# Patient Record
Sex: FEMALE | Race: WHITE | Employment: OTHER | URBAN - NONMETROPOLITAN AREA
[De-identification: names, ages, dates, MRNs, and addresses within clinical notes are randomized per-mention and may not be internally consistent; named-entity substitution may affect disease eponyms.]

---

## 2020-03-03 ENCOUNTER — OFFICE VISIT (OUTPATIENT)
Dept: FAMILY MEDICINE CLINIC | Age: 70
End: 2020-03-03
Payer: MEDICARE

## 2020-03-03 VITALS
HEIGHT: 62 IN | HEART RATE: 93 BPM | SYSTOLIC BLOOD PRESSURE: 186 MMHG | DIASTOLIC BLOOD PRESSURE: 110 MMHG | RESPIRATION RATE: 16 BRPM | TEMPERATURE: 97.9 F | OXYGEN SATURATION: 98 % | WEIGHT: 166 LBS | BODY MASS INDEX: 30.55 KG/M2

## 2020-03-03 DIAGNOSIS — Z13.220 SCREENING FOR CHOLESTEROL LEVEL: ICD-10-CM

## 2020-03-03 DIAGNOSIS — R63.5 WEIGHT GAIN: ICD-10-CM

## 2020-03-03 DIAGNOSIS — Z13.1 ENCOUNTER FOR SCREENING EXAMINATION FOR IMPAIRED GLUCOSE REGULATION AND DIABETES MELLITUS: ICD-10-CM

## 2020-03-03 LAB
ALBUMIN SERPL-MCNC: 4.8 G/DL (ref 3.5–5.2)
ALP BLD-CCNC: 77 U/L (ref 35–104)
ALT SERPL-CCNC: 16 U/L (ref 5–33)
ANION GAP SERPL CALCULATED.3IONS-SCNC: 17 MMOL/L (ref 7–19)
AST SERPL-CCNC: 19 U/L (ref 5–32)
BASOPHILS ABSOLUTE: 0.1 K/UL (ref 0–0.2)
BASOPHILS RELATIVE PERCENT: 0.6 % (ref 0–1)
BILIRUB SERPL-MCNC: <0.2 MG/DL (ref 0.2–1.2)
BUN BLDV-MCNC: 14 MG/DL (ref 8–23)
CALCIUM SERPL-MCNC: 9.2 MG/DL (ref 8.8–10.2)
CHLORIDE BLD-SCNC: 106 MMOL/L (ref 98–111)
CHOLESTEROL, TOTAL: 260 MG/DL (ref 160–199)
CO2: 23 MMOL/L (ref 22–29)
CREAT SERPL-MCNC: 0.7 MG/DL (ref 0.5–0.9)
EOSINOPHILS ABSOLUTE: 0.3 K/UL (ref 0–0.6)
EOSINOPHILS RELATIVE PERCENT: 3.1 % (ref 0–5)
GFR NON-AFRICAN AMERICAN: >60
GLUCOSE BLD-MCNC: 99 MG/DL (ref 74–109)
HCT VFR BLD CALC: 43.1 % (ref 37–47)
HDLC SERPL-MCNC: 73 MG/DL (ref 65–121)
HEMOGLOBIN: 13.6 G/DL (ref 12–16)
IMMATURE GRANULOCYTES #: 0.1 K/UL
LDL CHOLESTEROL CALCULATED: 160 MG/DL
LYMPHOCYTES ABSOLUTE: 2.6 K/UL (ref 1.1–4.5)
LYMPHOCYTES RELATIVE PERCENT: 30 % (ref 20–40)
MCH RBC QN AUTO: 30.5 PG (ref 27–31)
MCHC RBC AUTO-ENTMCNC: 31.6 G/DL (ref 33–37)
MCV RBC AUTO: 96.6 FL (ref 81–99)
MONOCYTES ABSOLUTE: 0.6 K/UL (ref 0–0.9)
MONOCYTES RELATIVE PERCENT: 6.4 % (ref 0–10)
NEUTROPHILS ABSOLUTE: 5.1 K/UL (ref 1.5–7.5)
NEUTROPHILS RELATIVE PERCENT: 59.3 % (ref 50–65)
PDW BLD-RTO: 13.2 % (ref 11.5–14.5)
PLATELET # BLD: 291 K/UL (ref 130–400)
PMV BLD AUTO: 10.9 FL (ref 9.4–12.3)
POTASSIUM SERPL-SCNC: 4.4 MMOL/L (ref 3.5–5)
RBC # BLD: 4.46 M/UL (ref 4.2–5.4)
SODIUM BLD-SCNC: 146 MMOL/L (ref 136–145)
T4 FREE: 1.05 NG/DL (ref 0.93–1.7)
TOTAL PROTEIN: 7.7 G/DL (ref 6.6–8.7)
TRIGL SERPL-MCNC: 134 MG/DL (ref 0–149)
TSH SERPL DL<=0.05 MIU/L-ACNC: 1.73 UIU/ML (ref 0.27–4.2)
WBC # BLD: 8.6 K/UL (ref 4.8–10.8)

## 2020-03-03 PROCEDURE — G8417 CALC BMI ABV UP PARAM F/U: HCPCS | Performed by: NURSE PRACTITIONER

## 2020-03-03 PROCEDURE — 4040F PNEUMOC VAC/ADMIN/RCVD: CPT | Performed by: NURSE PRACTITIONER

## 2020-03-03 PROCEDURE — 4004F PT TOBACCO SCREEN RCVD TLK: CPT | Performed by: NURSE PRACTITIONER

## 2020-03-03 PROCEDURE — 3017F COLORECTAL CA SCREEN DOC REV: CPT | Performed by: NURSE PRACTITIONER

## 2020-03-03 PROCEDURE — G8400 PT W/DXA NO RESULTS DOC: HCPCS | Performed by: NURSE PRACTITIONER

## 2020-03-03 PROCEDURE — 1090F PRES/ABSN URINE INCON ASSESS: CPT | Performed by: NURSE PRACTITIONER

## 2020-03-03 PROCEDURE — G8484 FLU IMMUNIZE NO ADMIN: HCPCS | Performed by: NURSE PRACTITIONER

## 2020-03-03 PROCEDURE — G8427 DOCREV CUR MEDS BY ELIG CLIN: HCPCS | Performed by: NURSE PRACTITIONER

## 2020-03-03 PROCEDURE — 99203 OFFICE O/P NEW LOW 30 MIN: CPT | Performed by: NURSE PRACTITIONER

## 2020-03-03 PROCEDURE — 1123F ACP DISCUSS/DSCN MKR DOCD: CPT | Performed by: NURSE PRACTITIONER

## 2020-03-03 SDOH — HEALTH STABILITY: MENTAL HEALTH: HOW OFTEN DO YOU HAVE A DRINK CONTAINING ALCOHOL?: MONTHLY OR LESS

## 2020-03-03 ASSESSMENT — ENCOUNTER SYMPTOMS
COUGH: 1
TROUBLE SWALLOWING: 0
WHEEZING: 1
ABDOMINAL PAIN: 0
BLOOD IN STOOL: 0
SHORTNESS OF BREATH: 1
CONSTIPATION: 0

## 2020-03-03 ASSESSMENT — PATIENT HEALTH QUESTIONNAIRE - PHQ9
SUM OF ALL RESPONSES TO PHQ QUESTIONS 1-9: 0
SUM OF ALL RESPONSES TO PHQ QUESTIONS 1-9: 0
SUM OF ALL RESPONSES TO PHQ9 QUESTIONS 1 & 2: 0
2. FEELING DOWN, DEPRESSED OR HOPELESS: 0
1. LITTLE INTEREST OR PLEASURE IN DOING THINGS: 0

## 2020-03-03 NOTE — PROGRESS NOTES
SUBJECTIVE:  Here today to establish. Patient ID: Adriana Chapman is a 78 y. o.female. HPI:   HPI   Blood pressure in office elevated but states she checks her BP at home and it runs. 138/79. Wanting labs. Haven't had labs in a few years. Hasn't had a mammogram and doesn't want one. Over 25 years. Lump in breast removed benign . D&C,     Gastric ulcer after  past. 12 years. Continues to have stomach pains. Takes OTC Omeprazole, eats probiotic. Past Medical History:   Diagnosis Date    Hx of gastric ulcer      Prior to Visit Medications    Not on File     No Known Allergies  Past Surgical History:   Procedure Laterality Date    BREAST CYST EXCISION Right     GUM SURGERY       Family History   Problem Relation Age of Onset    Heart Disease Mother     No Known Problems Father     Tuberculosis Maternal Grandmother     Tuberculosis Maternal Grandfather      Social History     Socioeconomic History    Marital status:       Spouse name: Not on file    Number of children: Not on file    Years of education: Not on file    Highest education level: Not on file   Occupational History    Not on file   Social Needs    Financial resource strain: Not on file    Food insecurity:     Worry: Not on file     Inability: Not on file    Transportation needs:     Medical: Not on file     Non-medical: Not on file   Tobacco Use    Smoking status: Current Every Day Smoker     Packs/day: 1.00     Start date: 3/3/1963    Smokeless tobacco: Never Used   Substance and Sexual Activity    Alcohol use: Yes     Frequency: Monthly or less    Drug use: Not on file    Sexual activity: Not on file   Lifestyle    Physical activity:     Days per week: Not on file     Minutes per session: Not on file    Stress: Not on file   Relationships    Social connections:     Talks on phone: Not on file     Gets together: Not on file     Attends Scientologist service: Not on file     Active member of club or organization: Not on file     Attends meetings of clubs or organizations: Not on file     Relationship status: Not on file    Intimate partner violence:     Fear of current or ex partner: Not on file     Emotionally abused: Not on file     Physically abused: Not on file     Forced sexual activity: Not on file   Other Topics Concern    Not on file   Social History Narrative    Not on file       Review of Systems   Constitutional: Positive for diaphoresis and unexpected weight change (gained). Negative for chills. HENT: Positive for tinnitus. Negative for hearing loss and trouble swallowing. Eyes: Negative for visual disturbance. Respiratory: Positive for cough, shortness of breath and wheezing. Cardiovascular: Negative for chest pain and leg swelling. Gastrointestinal: Negative for abdominal pain, blood in stool and constipation. Genitourinary: Negative for difficulty urinating, dysuria and menstrual problem. Musculoskeletal: Positive for neck pain. Negative for arthralgias. Neurological: Positive for dizziness. Negative for headaches. Psychiatric/Behavioral: Negative for sleep disturbance. The patient is nervous/anxious. OBJECTIVE:    Physical Exam  Constitutional:       Appearance: Normal appearance. She is well-developed and well-groomed. HENT:      Head: Normocephalic and atraumatic. Right Ear: Tympanic membrane, ear canal and external ear normal. There is no impacted cerumen. Left Ear: Tympanic membrane, ear canal and external ear normal. There is no impacted cerumen. Nose: Nose normal.      Mouth/Throat:      Lips: Pink. Mouth: Mucous membranes are moist.      Dentition: Normal dentition. Pharynx: Oropharynx is clear. Uvula midline. Eyes:      General: Lids are normal.         Right eye: No discharge. Left eye: No discharge. Extraocular Movements: Extraocular movements intact.       Conjunctiva/sclera: Conjunctivae normal.      Right eye: Right conjunctiva is not injected. Left eye: Left conjunctiva is not injected. Pupils: Pupils are equal, round, and reactive to light. Neck:      Musculoskeletal: Full passive range of motion without pain, normal range of motion and neck supple. Thyroid: No thyromegaly. Vascular: No carotid bruit or JVD. Cardiovascular:      Rate and Rhythm: Normal rate and regular rhythm. Pulses: Normal pulses. Carotid pulses are 2+ on the right side and 2+ on the left side. Radial pulses are 2+ on the right side and 2+ on the left side. Heart sounds: Normal heart sounds, S1 normal and S2 normal. No murmur. Pulmonary:      Effort: Pulmonary effort is normal.      Breath sounds: Normal breath sounds. Abdominal:      General: Bowel sounds are normal. There is no distension or abdominal bruit. Palpations: Abdomen is soft. There is no mass. Hernia: No hernia is present. Musculoskeletal: Normal range of motion. Right lower leg: No edema. Left lower leg: No edema. Lymphadenopathy:      Cervical: No cervical adenopathy. Right cervical: No superficial cervical adenopathy. Left cervical: No superficial cervical adenopathy. Upper Body:      Right upper body: No supraclavicular adenopathy. Left upper body: No supraclavicular adenopathy. Skin:     General: Skin is warm and dry. Coloration: Skin is not pale. Findings: No lesion or rash. Nails: There is no clubbing. Neurological:      Mental Status: She is alert and oriented to person, place, and time. Motor: No weakness or tremor. Coordination: Coordination normal.      Deep Tendon Reflexes: Reflexes are normal and symmetric. Psychiatric:         Attention and Perception: Attention normal.         Mood and Affect: Mood normal.         Speech: Speech normal.         Behavior: Behavior normal. Behavior is cooperative. Thought Content:  Thought content normal.         Cognition and

## 2020-05-21 ENCOUNTER — VIRTUAL VISIT (OUTPATIENT)
Dept: FAMILY MEDICINE CLINIC | Age: 70
End: 2020-05-21
Payer: MEDICARE

## 2020-05-21 VITALS
SYSTOLIC BLOOD PRESSURE: 142 MMHG | DIASTOLIC BLOOD PRESSURE: 77 MMHG | BODY MASS INDEX: 30.86 KG/M2 | HEART RATE: 93 BPM | WEIGHT: 166 LBS

## 2020-05-21 PROCEDURE — 99406 BEHAV CHNG SMOKING 3-10 MIN: CPT | Performed by: NURSE PRACTITIONER

## 2020-05-21 PROCEDURE — 4040F PNEUMOC VAC/ADMIN/RCVD: CPT | Performed by: NURSE PRACTITIONER

## 2020-05-21 PROCEDURE — G0438 PPPS, INITIAL VISIT: HCPCS | Performed by: NURSE PRACTITIONER

## 2020-05-21 PROCEDURE — 3017F COLORECTAL CA SCREEN DOC REV: CPT | Performed by: NURSE PRACTITIONER

## 2020-05-21 PROCEDURE — 1123F ACP DISCUSS/DSCN MKR DOCD: CPT | Performed by: NURSE PRACTITIONER

## 2020-05-21 ASSESSMENT — PATIENT HEALTH QUESTIONNAIRE - PHQ9
SUM OF ALL RESPONSES TO PHQ QUESTIONS 1-9: 0
SUM OF ALL RESPONSES TO PHQ QUESTIONS 1-9: 0

## 2020-05-21 ASSESSMENT — LIFESTYLE VARIABLES
HOW OFTEN DURING THE LAST YEAR HAVE YOU FOUND THAT YOU WERE NOT ABLE TO STOP DRINKING ONCE YOU HAD STARTED: 0
HOW OFTEN DURING THE LAST YEAR HAVE YOU FAILED TO DO WHAT WAS NORMALLY EXPECTED FROM YOU BECAUSE OF DRINKING: 0
HOW OFTEN DURING THE LAST YEAR HAVE YOU HAD A FEELING OF GUILT OR REMORSE AFTER DRINKING: 0
HOW OFTEN DO YOU HAVE A DRINK CONTAINING ALCOHOL: 1
AUDIT-C TOTAL SCORE: 1
HAVE YOU OR SOMEONE ELSE BEEN INJURED AS A RESULT OF YOUR DRINKING: 0
HAS A RELATIVE, FRIEND, DOCTOR, OR ANOTHER HEALTH PROFESSIONAL EXPRESSED CONCERN ABOUT YOUR DRINKING OR SUGGESTED YOU CUT DOWN: 0
HOW OFTEN DO YOU HAVE SIX OR MORE DRINKS ON ONE OCCASION: 0
HOW OFTEN DURING THE LAST YEAR HAVE YOU NEEDED AN ALCOHOLIC DRINK FIRST THING IN THE MORNING TO GET YOURSELF GOING AFTER A NIGHT OF HEAVY DRINKING: 0
HOW MANY STANDARD DRINKS CONTAINING ALCOHOL DO YOU HAVE ON A TYPICAL DAY: 0
AUDIT TOTAL SCORE: 1
HOW OFTEN DURING THE LAST YEAR HAVE YOU BEEN UNABLE TO REMEMBER WHAT HAPPENED THE NIGHT BEFORE BECAUSE YOU HAD BEEN DRINKING: 0

## 2020-05-21 NOTE — PROGRESS NOTES
Medicare Annual Wellness Visit  Name: Joyce Blunt Date: 2020   MRN: [id number] Sex: Female   Age: 78 y.o. Ethnicity: Non-/Non    : 1950 Race: Mary Prado is here for Medicare AWV    Screenings for behavioral, psychosocial and functional/safety risks, and cognitive dysfunction are all negative except as indicated below. These results, as well as other patient data from the [address] form, are documented in Flowsheets linked to this Encounter. Pt declines Breast cancer. Will do the cologuard  Does smoke a pk a day. Will be willing to try to half pk. No Known Allergies      Prior to Visit Medications    Not on File         Past Medical History:   Diagnosis Date    Hx of gastric ulcer        Past Surgical History:   Procedure Laterality Date    BREAST CYST EXCISION Right     GUM SURGERY           Family History   Problem Relation Age of Onset    Heart Disease Mother     No Known Problems Father     Tuberculosis Maternal Grandmother     Tuberculosis Maternal Grandfather        CareTeam (Including outside providers/suppliers regularly involved in providing care):   Patient Care Team:  NI Travis as PCP - General (Nurse Practitioner Family)  NI Travis as PCP - [facility] Empaneled Provider    Wt Readings from Last 3 Encounters:   20 166 lb (75.3 kg)   20 166 lb (75.3 kg)     Vitals:    20 0952   BP: (!) 142/77   Pulse: 93   Weight: 166 lb (75.3 kg)     Body mass index is 30.86 kg/m². Based upon direct observation of the patient, evaluation of cognition reveals recent and remote memory intact. Patient's complete Health Risk Assessment and screening values have been reviewed and are found in Flowsheets. The following problems were reviewed today and where indicated follow up appointments were made and/or referrals ordered.     Positive Risk Factor Screenings with Interventions:     Substance Abuse:  Social this time  · Dental exam overdue:  patient encouraged to make appointment with his/her dentist    Safety:     Safety Interventions:  · Patient declines any further evaluation/treatment for this issue    Personalized Preventive Plan   Current Health Maintenance Status    There is no immunization history on file for this patient. Health Maintenance   Topic Date Due    Hepatitis C screen  1950    DTaP/Tdap/Td vaccine (1 - Tdap) 08/30/1968    Breast cancer screen  08/30/1999    Shingles Vaccine (1 of 2) 08/30/1999    Colon cancer screen colonoscopy  08/30/1999    DEXA (modify frequency per FRAX score)  08/30/2004    Pneumococcal 65+ years Vaccine (1 of 1 - PPSV23) 08/30/2014    Annual Wellness Visit (AWV)  03/03/2020    Flu vaccine (Season Ended) 09/01/2020    Lipid screen  03/03/2025    Hepatitis A vaccine  Aged Out    Hepatitis B vaccine  Aged Out    Hib vaccine  Aged Out    Meningococcal (ACWY) vaccine  Aged Out     Recommendations for Naytev Due: see orders and patient instructions/AVS.  . Recommended screening schedule for the next 5-10 years is provided to the patient in written form: see Patient Instructions/AVS.    Jose Mahmood was seen today for medicare awv. Diagnoses and all orders for this visit:    Screening for colon cancer  -     Cologuard; Future    Personal history of tobacco use, presenting hazards to health  -     PA TOBACCO USE CESSATION INTERMEDIATE 3-10 MINUTES [96938]    Asymptomatic menopausal state  -     DEXA Bone Density Axial Skeleton; Future    Routine general medical examination at a health care facility        Plans to come in for Pneumoccocal vaccine 23    Ana Luisa Toledo is a 78 y.o. female being evaluated by a Virtual Visit (video and audio) encounter to address concerns as mentioned above. A caregiver was present when appropriate.  Due to this being a TeleHealth encounter (During [facility] public health emergency), evaluation of the following organ systems was limited: Vitals/Constitutional/EENT/Resp/CV/GI//MS/Neuro/Skin/Heme-Lymph-Imm. Pursuant to the emergency declaration under the [address] [place] [address] and the [employer] Act, this Virtual Visit was conducted with patient's (and/or legal guardian's) consent, to reduce the patient's risk of exposure to COVID-19 and provide necessary medical care. The patient (and/or legal guardian) has also been advised to contact this office for worsening conditions or problems, and seek emergency medical treatment and/or call 911 if deemed necessary. Patient identification was verified at the start of the visit: Yes    Services were provided through a video synchronous discussion virtually to substitute for in-person clinic visit. Patient and provider were located at their individual homes. --NI Khanna on 5/21/2020 at 12:58 PM    An electronic signature was used to authenticate this note.

## 2022-09-13 ENCOUNTER — HOSPITAL ENCOUNTER (OUTPATIENT)
Dept: GENERAL RADIOLOGY | Age: 72
Discharge: HOME | End: 2022-09-13
Payer: MEDICARE

## 2022-09-13 ENCOUNTER — OFFICE VISIT (OUTPATIENT)
Dept: FAMILY MEDICINE CLINIC | Age: 72
End: 2022-09-13
Payer: MEDICARE

## 2022-09-13 VITALS
SYSTOLIC BLOOD PRESSURE: 158 MMHG | WEIGHT: 162 LBS | DIASTOLIC BLOOD PRESSURE: 84 MMHG | HEIGHT: 62 IN | BODY MASS INDEX: 29.81 KG/M2 | TEMPERATURE: 98 F | HEART RATE: 90 BPM | OXYGEN SATURATION: 97 % | RESPIRATION RATE: 20 BRPM

## 2022-09-13 DIAGNOSIS — R05.9 COUGH: ICD-10-CM

## 2022-09-13 DIAGNOSIS — Z87.891 PERSONAL HISTORY OF TOBACCO USE: ICD-10-CM

## 2022-09-13 DIAGNOSIS — R53.82 CHRONIC FATIGUE: ICD-10-CM

## 2022-09-13 DIAGNOSIS — Z87.891 PERSONAL HISTORY OF TOBACCO USE: Primary | ICD-10-CM

## 2022-09-13 LAB
ALBUMIN SERPL-MCNC: 4.3 G/DL (ref 3.5–5.2)
ALP BLD-CCNC: 85 U/L (ref 35–104)
ALT SERPL-CCNC: 21 U/L (ref 5–33)
ANION GAP SERPL CALCULATED.3IONS-SCNC: 12 MMOL/L (ref 7–19)
AST SERPL-CCNC: 26 U/L (ref 5–32)
BASOPHILS ABSOLUTE: 0.1 K/UL (ref 0–0.2)
BASOPHILS RELATIVE PERCENT: 0.6 % (ref 0–1)
BILIRUB SERPL-MCNC: 0.3 MG/DL (ref 0.2–1.2)
BUN BLDV-MCNC: 15 MG/DL (ref 8–23)
CALCIUM SERPL-MCNC: 9.1 MG/DL (ref 8.8–10.2)
CHLORIDE BLD-SCNC: 103 MMOL/L (ref 98–111)
CO2: 22 MMOL/L (ref 22–29)
CREAT SERPL-MCNC: 0.9 MG/DL (ref 0.5–0.9)
EOSINOPHILS ABSOLUTE: 0.2 K/UL (ref 0–0.6)
EOSINOPHILS RELATIVE PERCENT: 1.8 % (ref 0–5)
GFR AFRICAN AMERICAN: >59
GFR NON-AFRICAN AMERICAN: >60
GLUCOSE BLD-MCNC: 96 MG/DL (ref 74–109)
HCT VFR BLD CALC: 39.3 % (ref 37–47)
HEMOGLOBIN: 12.4 G/DL (ref 12–16)
IMMATURE GRANULOCYTES #: 0 K/UL
LYMPHOCYTES ABSOLUTE: 3 K/UL (ref 1.1–4.5)
LYMPHOCYTES RELATIVE PERCENT: 31.2 % (ref 20–40)
MCH RBC QN AUTO: 31.1 PG (ref 27–31)
MCHC RBC AUTO-ENTMCNC: 31.6 G/DL (ref 33–37)
MCV RBC AUTO: 98.5 FL (ref 81–99)
MONOCYTES ABSOLUTE: 0.7 K/UL (ref 0–0.9)
MONOCYTES RELATIVE PERCENT: 7.2 % (ref 0–10)
NEUTROPHILS ABSOLUTE: 5.6 K/UL (ref 1.5–7.5)
NEUTROPHILS RELATIVE PERCENT: 58.9 % (ref 50–65)
PDW BLD-RTO: 13.2 % (ref 11.5–14.5)
PLATELET # BLD: 312 K/UL (ref 130–400)
PMV BLD AUTO: 10.9 FL (ref 9.4–12.3)
POTASSIUM SERPL-SCNC: 4.6 MMOL/L (ref 3.5–5)
RBC # BLD: 3.99 M/UL (ref 4.2–5.4)
SODIUM BLD-SCNC: 137 MMOL/L (ref 136–145)
T4 FREE: 1.13 NG/DL (ref 0.93–1.7)
TOTAL PROTEIN: 7.4 G/DL (ref 6.6–8.7)
TSH SERPL DL<=0.05 MIU/L-ACNC: 1.27 UIU/ML (ref 0.27–4.2)
VITAMIN B-12: 473 PG/ML (ref 211–946)
WBC # BLD: 9.5 K/UL (ref 4.8–10.8)

## 2022-09-13 PROCEDURE — 71046 X-RAY EXAM CHEST 2 VIEWS: CPT | Performed by: RADIOLOGY

## 2022-09-13 PROCEDURE — 71046 X-RAY EXAM CHEST 2 VIEWS: CPT

## 2022-09-13 PROCEDURE — 1090F PRES/ABSN URINE INCON ASSESS: CPT | Performed by: NURSE PRACTITIONER

## 2022-09-13 PROCEDURE — G0296 VISIT TO DETERM LDCT ELIG: HCPCS | Performed by: NURSE PRACTITIONER

## 2022-09-13 PROCEDURE — G8427 DOCREV CUR MEDS BY ELIG CLIN: HCPCS | Performed by: NURSE PRACTITIONER

## 2022-09-13 PROCEDURE — 4004F PT TOBACCO SCREEN RCVD TLK: CPT | Performed by: NURSE PRACTITIONER

## 2022-09-13 PROCEDURE — G8417 CALC BMI ABV UP PARAM F/U: HCPCS | Performed by: NURSE PRACTITIONER

## 2022-09-13 PROCEDURE — 1123F ACP DISCUSS/DSCN MKR DOCD: CPT | Performed by: NURSE PRACTITIONER

## 2022-09-13 PROCEDURE — 71271 CT THORAX LUNG CANCER SCR C-: CPT

## 2022-09-13 PROCEDURE — G8400 PT W/DXA NO RESULTS DOC: HCPCS | Performed by: NURSE PRACTITIONER

## 2022-09-13 PROCEDURE — 99214 OFFICE O/P EST MOD 30 MIN: CPT | Performed by: NURSE PRACTITIONER

## 2022-09-13 PROCEDURE — 3017F COLORECTAL CA SCREEN DOC REV: CPT | Performed by: NURSE PRACTITIONER

## 2022-09-13 SDOH — ECONOMIC STABILITY: FOOD INSECURITY: WITHIN THE PAST 12 MONTHS, YOU WORRIED THAT YOUR FOOD WOULD RUN OUT BEFORE YOU GOT MONEY TO BUY MORE.: NEVER TRUE

## 2022-09-13 SDOH — ECONOMIC STABILITY: FOOD INSECURITY: WITHIN THE PAST 12 MONTHS, THE FOOD YOU BOUGHT JUST DIDN'T LAST AND YOU DIDN'T HAVE MONEY TO GET MORE.: NEVER TRUE

## 2022-09-13 ASSESSMENT — ENCOUNTER SYMPTOMS
DIARRHEA: 1
SHORTNESS OF BREATH: 1
CONSTIPATION: 0
COUGH: 1
BLOOD IN STOOL: 0
TROUBLE SWALLOWING: 0
VOICE CHANGE: 0
ABDOMINAL PAIN: 0

## 2022-09-13 ASSESSMENT — PATIENT HEALTH QUESTIONNAIRE - PHQ9
SUM OF ALL RESPONSES TO PHQ QUESTIONS 1-9: 0
SUM OF ALL RESPONSES TO PHQ9 QUESTIONS 1 & 2: 0
SUM OF ALL RESPONSES TO PHQ QUESTIONS 1-9: 0
2. FEELING DOWN, DEPRESSED OR HOPELESS: 0
1. LITTLE INTEREST OR PLEASURE IN DOING THINGS: 0
SUM OF ALL RESPONSES TO PHQ QUESTIONS 1-9: 0
SUM OF ALL RESPONSES TO PHQ QUESTIONS 1-9: 0

## 2022-09-13 ASSESSMENT — SOCIAL DETERMINANTS OF HEALTH (SDOH): HOW HARD IS IT FOR YOU TO PAY FOR THE VERY BASICS LIKE FOOD, HOUSING, MEDICAL CARE, AND HEATING?: NOT HARD AT ALL

## 2022-09-13 NOTE — PROGRESS NOTES
SUBJECTIVE:  Fatigue  Patient ID: Lyn Rios is a 67 y.o. female. HPI:   Fatigue  Associated symptoms include arthralgias, chest pain, coughing, fatigue and myalgias. Pertinent negatives include no abdominal pain, headaches or weakness. This year can't seem to do things that she use to just making it to noon and then needs to nap. Still smoking. Age started 14years of age. 1 pk a day. Blood elevated in the office. States in a medical office it is high. Pings of chest pain. At times Short of breath. Cough not a lot. to clear throat. Past Medical History:   Diagnosis Date    Hx of gastric ulcer       Prior to Visit Medications    Not on File      No Known Allergies    Review of Systems   Constitutional:  Positive for fatigue. HENT:  Negative for trouble swallowing and voice change. Respiratory:  Positive for cough and shortness of breath. Cardiovascular:  Positive for chest pain and leg swelling. Gastrointestinal:  Positive for diarrhea. Negative for abdominal pain, blood in stool and constipation. Genitourinary:  Negative for difficulty urinating and frequency. Musculoskeletal:  Positive for arthralgias and myalgias. Neurological:  Positive for dizziness. Negative for syncope, weakness and headaches. Psychiatric/Behavioral:  Negative for sleep disturbance. OBJECTIVE:    Physical Exam  Constitutional:       Appearance: Normal appearance. She is well-developed and well-groomed. HENT:      Head: Normocephalic and atraumatic. Right Ear: Tympanic membrane, ear canal and external ear normal. There is no impacted cerumen. Left Ear: Tympanic membrane, ear canal and external ear normal. There is no impacted cerumen. Nose: Nose normal.      Mouth/Throat:      Lips: Pink. Mouth: Mucous membranes are moist.      Dentition: Normal dentition. Pharynx: Oropharynx is clear. Uvula midline. Eyes:      General: Lids are normal.         Right eye: No discharge. Left eye: No discharge. Extraocular Movements: Extraocular movements intact. Conjunctiva/sclera: Conjunctivae normal.      Right eye: Right conjunctiva is not injected. Left eye: Left conjunctiva is not injected. Pupils: Pupils are equal, round, and reactive to light. Neck:      Thyroid: No thyromegaly. Vascular: No carotid bruit or JVD. Cardiovascular:      Rate and Rhythm: Normal rate and regular rhythm. Pulses: Normal pulses. Carotid pulses are 2+ on the right side and 2+ on the left side. Radial pulses are 2+ on the right side and 2+ on the left side. Heart sounds: Normal heart sounds, S1 normal and S2 normal. No murmur heard. Pulmonary:      Effort: Pulmonary effort is normal.      Breath sounds: Normal breath sounds. Chest:   Breasts:     Right: No supraclavicular adenopathy. Left: No supraclavicular adenopathy. Abdominal:      General: Bowel sounds are normal. There is no distension or abdominal bruit. Palpations: Abdomen is soft. There is no mass. Hernia: No hernia is present. Musculoskeletal:         General: Normal range of motion. Cervical back: Full passive range of motion without pain, normal range of motion and neck supple. Right lower leg: No edema. Left lower leg: No edema. Lymphadenopathy:      Cervical: No cervical adenopathy. Right cervical: No superficial cervical adenopathy. Left cervical: No superficial cervical adenopathy. Upper Body:      Right upper body: No supraclavicular adenopathy. Left upper body: No supraclavicular adenopathy. Skin:     General: Skin is warm and dry. Coloration: Skin is not pale. Findings: No lesion or rash. Nails: There is no clubbing. Neurological:      Mental Status: She is alert and oriented to person, place, and time. Motor: No weakness or tremor.       Coordination: Coordination normal.      Deep Tendon Reflexes: Reflexes are normal and symmetric. Psychiatric:         Attention and Perception: Attention normal.         Mood and Affect: Mood normal.         Speech: Speech normal.         Behavior: Behavior normal. Behavior is cooperative. Thought Content: Thought content normal.         Cognition and Memory: Cognition and memory normal.         Judgment: Judgment normal.      BP (!) 158/84 (Site: Left Upper Arm, Position: Sitting, Cuff Size: Large Adult)   Pulse 90   Temp 98 °F (36.7 °C) (Temporal)   Resp 20   Ht 5' 1.5\" (1.562 m)   Wt 162 lb (73.5 kg)   SpO2 97%   BMI 30.11 kg/m²      ASSESSMENT:      ICD-10-CM    1. Personal history of tobacco use  Z87.891 CA VISIT TO DISCUSS LUNG CA SCREEN W LDCT     CT Lung Screen (Annual)     NI Miranda, Card[facility], [place]      2. Chronic fatigue  R53.82 NI Morris, [facility], [place]     CBC with Auto Differential     Comprehensive Metabolic Panel     TSH     T4, Free     Vitamin B12      3. Cough  R05.9 XR CHEST STANDARD (2 VW)          PLAN:    Eduarda BRUNNER Nix: Fatigue and Check-Up      Diagnosis and orders for this visit are above. Low Dose CT (LDCT) Lung Screening criteria met:     Age 50-77(Medicare) or 50-80 (USPSTF)   Pack year smoking >20   Still smoking or less than 15 year since quit   No sign or symptoms of lung cancer   > 11 months since last LDCT     Risks and benefits of lung cancer screening with LDCT scans discussed:    Significance of positive screen - False-positive LDCT results often occur. 95% of all positive results do not lead to a diagnosis of cancer. Usually further imaging can resolve most false-positive results; however, some patients may require invasive procedures. Over diagnosis risk - 10% to 12% of screen-detected lung cancer cases are over diagnosed--that is, the cancer would not have been detected in the patient's lifetime without the screening.     Need for follow up screens annually to continue lung cancer

## 2022-10-21 ENCOUNTER — TELEPHONE (OUTPATIENT)
Dept: CARDIOLOGY CLINIC | Age: 72
End: 2022-10-21

## 2022-10-24 ENCOUNTER — OFFICE VISIT (OUTPATIENT)
Dept: CARDIOLOGY CLINIC | Age: 72
End: 2022-10-24
Payer: MEDICARE

## 2022-10-24 VITALS
WEIGHT: 160 LBS | HEART RATE: 68 BPM | DIASTOLIC BLOOD PRESSURE: 76 MMHG | BODY MASS INDEX: 29.44 KG/M2 | SYSTOLIC BLOOD PRESSURE: 134 MMHG | HEIGHT: 62 IN

## 2022-10-24 DIAGNOSIS — R68.89 DECREASED EXERCISE TOLERANCE: Primary | ICD-10-CM

## 2022-10-24 DIAGNOSIS — R07.89 ATYPICAL CHEST PAIN: Primary | ICD-10-CM

## 2022-10-24 DIAGNOSIS — E78.2 MIXED HYPERLIPIDEMIA: ICD-10-CM

## 2022-10-24 DIAGNOSIS — R06.02 SHORTNESS OF BREATH: ICD-10-CM

## 2022-10-24 DIAGNOSIS — R53.82 CHRONIC FATIGUE: ICD-10-CM

## 2022-10-24 DIAGNOSIS — R68.89 DECREASED EXERCISE TOLERANCE: ICD-10-CM

## 2022-10-24 DIAGNOSIS — F17.219 CIGARETTE NICOTINE DEPENDENCE WITH NICOTINE-INDUCED DISORDER: ICD-10-CM

## 2022-10-24 DIAGNOSIS — R07.89 ATYPICAL CHEST PAIN: ICD-10-CM

## 2022-10-24 PROCEDURE — 1123F ACP DISCUSS/DSCN MKR DOCD: CPT | Performed by: CLINICAL NURSE SPECIALIST

## 2022-10-24 PROCEDURE — 1090F PRES/ABSN URINE INCON ASSESS: CPT | Performed by: CLINICAL NURSE SPECIALIST

## 2022-10-24 PROCEDURE — G8417 CALC BMI ABV UP PARAM F/U: HCPCS | Performed by: CLINICAL NURSE SPECIALIST

## 2022-10-24 PROCEDURE — 4004F PT TOBACCO SCREEN RCVD TLK: CPT | Performed by: CLINICAL NURSE SPECIALIST

## 2022-10-24 PROCEDURE — 93000 ELECTROCARDIOGRAM COMPLETE: CPT | Performed by: CLINICAL NURSE SPECIALIST

## 2022-10-24 PROCEDURE — G8484 FLU IMMUNIZE NO ADMIN: HCPCS | Performed by: CLINICAL NURSE SPECIALIST

## 2022-10-24 PROCEDURE — 3017F COLORECTAL CA SCREEN DOC REV: CPT | Performed by: CLINICAL NURSE SPECIALIST

## 2022-10-24 PROCEDURE — 99203 OFFICE O/P NEW LOW 30 MIN: CPT | Performed by: CLINICAL NURSE SPECIALIST

## 2022-10-24 PROCEDURE — G8427 DOCREV CUR MEDS BY ELIG CLIN: HCPCS | Performed by: CLINICAL NURSE SPECIALIST

## 2022-10-24 PROCEDURE — G8400 PT W/DXA NO RESULTS DOC: HCPCS | Performed by: CLINICAL NURSE SPECIALIST

## 2022-10-24 ASSESSMENT — ENCOUNTER SYMPTOMS
WHEEZING: 0
EYE REDNESS: 0
FACIAL SWELLING: 0
COUGH: 0
NAUSEA: 0
SHORTNESS OF BREATH: 1
ABDOMINAL PAIN: 0
CHEST TIGHTNESS: 0
VOMITING: 0

## 2022-10-24 NOTE — PATIENT INSTRUCTIONS
Quit smoking. Call the [address] [facility] [address] [employer] at the [employer] and [address] [facility] located on the first floor of [place] [address] through hospital main entrance and turn immediately to your left. Patient contact number:  [phone] (home)      Date/Arrival Time:      Stress Echocardiogram      This records the heart's activity during a cardiac stress test.  A stress echocardiogram is a very effective, noninvasive test that can help determine whether you have blockages in your coronary arteries. The exam takes approximately thirty minutes. To help ensure accurate results, patients should take the following steps in preparation for a stress echocardiogram:   Refrain from strenuous activity for 12 hours before the test.   Do not eat, drink, or smoke for two hours prior to the test.  Unless instructed otherwise by your physician, you should continue to take prescribed medications. Wear loose, comfortable clothing and walking shoes. If you need to change this appointment, please call [phone] to reschedule.

## 2022-10-24 NOTE — PROGRESS NOTES
[facility], [address], [address]  [address]  Phone: [phone]  Fax: [phone]    OFFICE VISIT:  10/24/2022    Adilene Jara - : 1950    Reason For Visit:  Cash Belcher is a 67 y.o. female who is here for New Patient and Establish Cardiologist       Diagnosis Orders   1. Decreased exercise tolerance        2. Shortness of breath  EKG 12 lead      3. Atypical chest pain        4. Cigarette nicotine dependence with nicotine-induced disorder        5. Mixed hyperlipidemia              HPI  Patient referred by her PCP for complaints of worsening fatigue, twinges of chest pain and shortness of breath. She reports history of long-term smoking and hyperlipidemia which is untreated by patient choice. .  There is questionable family history of heart disease in her mother    Patient states over the last year she is noticing more fatigue. She is to stop and pace her activities when she is doing exertional things. She notices some associated shortness of breath. He complains of \"twinges\" of chest pain, but these are not related to symptoms of fatigue generally. She is wondering if this is just related to older age. She reports she has to take a nap in the middle of the day if she is so tired and this is unusual for her      StarIN Cole is PCP. Adilene Jara has the following history as recorded in [employer]: There are no problems to display for this patient.     Past Medical History:   Diagnosis Date    Hx of gastric ulcer      Past Surgical History:   Procedure Laterality Date    BREAST CYST EXCISION Right     GUM SURGERY       Family History   Problem Relation Age of Onset    Heart Disease Mother     No Known Problems Father     Diabetes Sister     Tuberculosis Maternal Grandmother     Tuberculosis Maternal Grandfather      Social History     Tobacco Use    Smoking status: Every Day     Packs/day: 0.50     Years: 58.00     Pack years: 29.00     Types: Cigarettes     Start date: 3/3/1963     Passive exposure: Current    Smokeless tobacco: Never   Substance Use Topics    Alcohol use: Yes     Comment: rarely      No current outpatient medications on file. No current facility-administered medications for this visit. Allergies: Patient has no known allergies. Review of Systems  Review of Systems   Constitutional:  Positive for fatigue (worsening). Negative for activity change, diaphoresis, fever and unexpected weight change. HENT:  Negative for facial swelling and nosebleeds. Eyes:  Negative for redness and visual disturbance. Respiratory:  Positive for shortness of breath. Negative for cough, chest tightness and wheezing. Cardiovascular:  Positive for chest pain (twinges, non-exertional, atypical). Negative for palpitations and leg swelling. Gastrointestinal:  Negative for abdominal pain, nausea and vomiting. Endocrine: Negative for cold intolerance and heat intolerance. Genitourinary:  Negative for dysuria and hematuria. Musculoskeletal:  Negative for arthralgias and myalgias. Skin:  Negative for pallor and rash. Neurological:  Negative for dizziness, seizures, syncope, weakness and light-headedness. Hematological:  Does not bruise/bleed easily. Psychiatric/Behavioral:  Negative for agitation. The patient is not nervous/anxious. C/o  being easily anxious     Objective  Vital Signs - /76   Pulse 68   Ht 5' 1.5\" (1.562 m)   Wt 160 lb (72.6 kg)   BMI 29.74 kg/m²   Physical Exam  Vitals and nursing note reviewed. Constitutional:       General: She is not in acute distress. Appearance: Normal appearance. She is well-developed. She is not diaphoretic. HENT:      Head: Normocephalic and atraumatic. Right Ear: Hearing and external ear normal.      Left Ear: Hearing and external ear normal.      Nose: Nose normal.   Eyes:      General:         Right eye: No discharge. Left eye: No discharge. Pupils: Pupils are equal, round, and reactive to light. Neck:      Thyroid: No thyromegaly. Vascular: No carotid bruit or JVD. Trachea: No tracheal deviation. Cardiovascular:      Rate and Rhythm: Normal rate and regular rhythm. Heart sounds: Normal heart sounds. No murmur heard. No friction rub. No gallop. Pulmonary:      Effort: Pulmonary effort is normal. No respiratory distress. Breath sounds: Normal breath sounds. No wheezing or rales. Abdominal:      Palpations: Abdomen is soft. Tenderness: There is no abdominal tenderness. Musculoskeletal:         General: No swelling or deformity. Cervical back: Neck supple. No muscular tenderness. Right lower leg: No edema. Left lower leg: No edema. Comments: Normal gait and station   Skin:     General: Skin is warm and dry. Findings: No rash. Neurological:      General: No focal deficit present. Mental Status: She is alert and oriented to person, place, and time. Cranial Nerves: No cranial nerve deficit.    Psychiatric:         Mood and Affect: Mood normal.         Behavior: Behavior normal.         Judgment: Judgment normal.       Data:  Lab Results   Component Value Date/Time    WBC 9.5 09/13/2022 03:05 PM    RBC 3.99 09/13/2022 03:05 PM    HGB 12.4 09/13/2022 03:05 PM    HCT 39.3 09/13/2022 03:05 PM     09/13/2022 03:05 PM      Lab Results   Component Value Date/Time    CHOL 260 03/03/2020 09:46 AM    TRIG 134 03/03/2020 09:46 AM    HDL 73 03/03/2020 09:46 AM    LDLCALC 160 03/03/2020 09:46 AM     Lab Results   Component Value Date/Time     09/13/2022 03:05 PM    K 4.6 09/13/2022 03:05 PM     09/13/2022 03:05 PM    CO2 22 09/13/2022 03:05 PM    GLUCOSE 96 09/13/2022 03:05 PM    BUN 15 09/13/2022 03:05 PM    CREATININE 0.9 09/13/2022 03:05 PM    CALCIUM 9.1 09/13/2022 03:05 PM    ALT 21 09/13/2022 03:05 PM    AST 26 09/13/2022 03:05 PM     Lab Results   Component Value Date/Time TSH 1.270 09/13/2022 03:05 PM     EKG shows normal sinus rhythm rate 68    Assessment:     Diagnosis Orders   1. Decreased exercise tolerance        2. Shortness of breath  EKG 12 lead      3. Atypical chest pain        4. Cigarette nicotine dependence with nicotine-induced disorder        5. Mixed hyperlipidemia          Patient with decreased exercise tolerance, worsening fatigue, shortness of breath, atypical chest pain, smoking, untreated hyperlipidemia. We will be to check a stress echo to rule out myocardial ischemia. Nicotine dependence-Instructed patient in smoking cessation rationales, strategies, and available resources x 3 minutes. She is slowly tapering back    Stable cardiovascular status. No evidence of overt heart failure,angina or dysrhythmia. Plan    Orders Placed This Encounter   Procedures    EKG 12 lead     Order Specific Question:   Reason for Exam?     Answer: Other     Return in about 1 month (around 11/24/2022) for APRN. Quit smoking. Call the [address] [place] [address] 1-618-QUIT-NOW   Stress Echo    Call with any questionsor concerns  Follow up with NI Donohue for non cardiac problems  Report any new problems  Cardiovascular Fitness-Exercise as tolerated. Strive for 15 minutes of exercise most days of the week. Cardiac / HealthyDiet  Continue current medications as directed  Continue plan of treatment  It is always recommended that you bring your medicationsbottles with you to each visit - this is for your safety!        NI Walker

## 2022-11-28 ENCOUNTER — OFFICE VISIT (OUTPATIENT)
Dept: CARDIOLOGY CLINIC | Age: 72
End: 2022-11-28
Payer: MEDICARE

## 2022-11-28 VITALS
SYSTOLIC BLOOD PRESSURE: 132 MMHG | BODY MASS INDEX: 29.81 KG/M2 | OXYGEN SATURATION: 100 % | WEIGHT: 162 LBS | DIASTOLIC BLOOD PRESSURE: 86 MMHG | HEIGHT: 62 IN | HEART RATE: 75 BPM

## 2022-11-28 DIAGNOSIS — R53.82 CHRONIC FATIGUE: ICD-10-CM

## 2022-11-28 DIAGNOSIS — R06.02 SHORTNESS OF BREATH: ICD-10-CM

## 2022-11-28 DIAGNOSIS — R68.89 DECREASED EXERCISE TOLERANCE: ICD-10-CM

## 2022-11-28 DIAGNOSIS — F17.219 CIGARETTE NICOTINE DEPENDENCE WITH NICOTINE-INDUCED DISORDER: ICD-10-CM

## 2022-11-28 DIAGNOSIS — R94.39 ABNORMAL STRESS ECHO: Primary | ICD-10-CM

## 2022-11-28 DIAGNOSIS — E78.2 MIXED HYPERLIPIDEMIA: ICD-10-CM

## 2022-11-28 DIAGNOSIS — R07.89 ATYPICAL CHEST PAIN: ICD-10-CM

## 2022-11-28 PROCEDURE — 1090F PRES/ABSN URINE INCON ASSESS: CPT | Performed by: CLINICAL NURSE SPECIALIST

## 2022-11-28 PROCEDURE — 99213 OFFICE O/P EST LOW 20 MIN: CPT | Performed by: CLINICAL NURSE SPECIALIST

## 2022-11-28 PROCEDURE — G8400 PT W/DXA NO RESULTS DOC: HCPCS | Performed by: CLINICAL NURSE SPECIALIST

## 2022-11-28 PROCEDURE — G8427 DOCREV CUR MEDS BY ELIG CLIN: HCPCS | Performed by: CLINICAL NURSE SPECIALIST

## 2022-11-28 PROCEDURE — G8484 FLU IMMUNIZE NO ADMIN: HCPCS | Performed by: CLINICAL NURSE SPECIALIST

## 2022-11-28 PROCEDURE — G8417 CALC BMI ABV UP PARAM F/U: HCPCS | Performed by: CLINICAL NURSE SPECIALIST

## 2022-11-28 PROCEDURE — 3017F COLORECTAL CA SCREEN DOC REV: CPT | Performed by: CLINICAL NURSE SPECIALIST

## 2022-11-28 PROCEDURE — 4004F PT TOBACCO SCREEN RCVD TLK: CPT | Performed by: CLINICAL NURSE SPECIALIST

## 2022-11-28 PROCEDURE — 1123F ACP DISCUSS/DSCN MKR DOCD: CPT | Performed by: CLINICAL NURSE SPECIALIST

## 2022-11-28 RX ORDER — FERROUS SULFATE 325(65) MG
325 TABLET ORAL PRN
COMMUNITY

## 2022-11-28 RX ORDER — M-VIT,TX,IRON,MINS/CALC/FOLIC 27MG-0.4MG
1 TABLET ORAL DAILY
COMMUNITY

## 2022-11-28 ASSESSMENT — ENCOUNTER SYMPTOMS
WHEEZING: 0
SHORTNESS OF BREATH: 1
EYE REDNESS: 0
NAUSEA: 0
ABDOMINAL PAIN: 0
VOMITING: 0
CHEST TIGHTNESS: 0
FACIAL SWELLING: 0
COUGH: 0

## 2022-11-28 NOTE — PROGRESS NOTES
[facility], [address] [place] [address]  Phone: [phone]  Fax: [phone]    OFFICE VISIT:  2022    Alex Arteaga - : 1950    Reason For Visit:  Felton De La Paz is a 67 y.o. female who is here for Follow-up (Patient is following up for Magali Justin results. ) and Results       Diagnosis Orders   1. Abnormal stress echo        2. Decreased exercise tolerance  NM MYOCARDIAL SPECT REST EXERCISE OR RX      3. Shortness of breath  NM MYOCARDIAL SPECT REST EXERCISE OR RX      4. Mixed hyperlipidemia        5. Cigarette nicotine dependence with nicotine-induced disorder              HPI  Patient referred by her PCP for complaints of worsening fatigue, twinges of chest pain and shortness of breath. She reports history of long-term smoking and hyperlipidemia which is untreated by patient choice. .  There is questionable family history of heart disease in her mother    At recent visit she had a stress echocardiogram and is here today to discuss results. NI Feldman is PCP. Alex Arteaga has the following history as recorded in [employer]: There are no problems to display for this patient.     Past Medical History:   Diagnosis Date    Hx of gastric ulcer      Past Surgical History:   Procedure Laterality Date    BREAST CYST EXCISION Right     GUM SURGERY       Family History   Problem Relation Age of Onset    Heart Disease Mother     No Known Problems Father     Diabetes Sister     Tuberculosis Maternal Grandmother     Tuberculosis Maternal Grandfather      Social History     Tobacco Use    Smoking status: Every Day     Packs/day: 0.50     Years: 58.00     Pack years: 29.00     Types: Cigarettes     Start date: 3/3/1963     Passive exposure: Current    Smokeless tobacco: Never   Substance Use Topics    Alcohol use: Yes     Comment: rarely      Current Outpatient Medications   Medication Sig Dispense Refill    ferrous sulfate (IRON 325) 325 (65 Fe) MG tablet Take 325 mg by mouth as needed      Multiple Vitamins-Minerals (THERAPEUTIC MULTIVITAMIN-MINERALS) tablet Take 1 tablet by mouth daily       No current facility-administered medications for this visit. Allergies: Patient has no known allergies. Review of Systems  Review of Systems   Constitutional:  Positive for fatigue (worsening). Negative for activity change, diaphoresis, fever and unexpected weight change. HENT:  Negative for facial swelling and nosebleeds. Eyes:  Negative for redness and visual disturbance. Respiratory:  Positive for shortness of breath. Negative for cough, chest tightness and wheezing. Cardiovascular:  Positive for chest pain (twinges, non-exertional, atypical). Negative for palpitations and leg swelling. Gastrointestinal:  Negative for abdominal pain, nausea and vomiting. Endocrine: Negative for cold intolerance and heat intolerance. Genitourinary:  Negative for dysuria and hematuria. Musculoskeletal:  Negative for arthralgias and myalgias. Skin:  Negative for pallor and rash. Neurological:  Negative for dizziness, seizures, syncope, weakness and light-headedness. Hematological:  Does not bruise/bleed easily. Psychiatric/Behavioral:  Negative for agitation. The patient is not nervous/anxious. C/o  being easily anxious     Objective  Vital Signs - /86   Pulse 75   Ht 5' 1.5\" (1.562 m)   Wt 162 lb (73.5 kg)   SpO2 100%   BMI 30.11 kg/m²   Physical Exam  Vitals and nursing note reviewed. Constitutional:       General: She is not in acute distress. Appearance: She is well-developed. She is obese. She is not diaphoretic. HENT:      Head: Normocephalic and atraumatic. Right Ear: Hearing and external ear normal.      Left Ear: Hearing and external ear normal.      Nose: Nose normal.   Eyes:      General:         Right eye: No discharge. Left eye: No discharge. Pupils: Pupils are equal, round, and reactive to light.    Neck: Thyroid: No thyromegaly. Vascular: No carotid bruit or JVD. Trachea: No tracheal deviation. Cardiovascular:      Rate and Rhythm: Normal rate and regular rhythm. Heart sounds: Normal heart sounds. No murmur heard. No friction rub. No gallop. Pulmonary:      Effort: Pulmonary effort is normal. No respiratory distress. Breath sounds: Normal breath sounds. No wheezing or rales. Abdominal:      Palpations: Abdomen is soft. Tenderness: There is no abdominal tenderness. Musculoskeletal:         General: No swelling or deformity. Cervical back: Neck supple. No muscular tenderness. Right lower leg: No edema. Left lower leg: No edema. Comments: Normal gait and station   Skin:     General: Skin is warm and dry. Findings: No rash. Neurological:      General: No focal deficit present. Mental Status: She is alert and oriented to person, place, and time. Cranial Nerves: No cranial nerve deficit.    Psychiatric:         Mood and Affect: Mood normal.         Behavior: Behavior normal.         Judgment: Judgment normal.       Data:  Lab Results   Component Value Date/Time    WBC 9.5 09/13/2022 03:05 PM    RBC 3.99 09/13/2022 03:05 PM    HGB 12.4 09/13/2022 03:05 PM    HCT 39.3 09/13/2022 03:05 PM     09/13/2022 03:05 PM      Lab Results   Component Value Date/Time    CHOL 260 03/03/2020 09:46 AM    TRIG 134 03/03/2020 09:46 AM    HDL 73 03/03/2020 09:46 AM    LDLCALC 160 03/03/2020 09:46 AM     Lab Results   Component Value Date/Time     09/13/2022 03:05 PM    K 4.6 09/13/2022 03:05 PM     09/13/2022 03:05 PM    CO2 22 09/13/2022 03:05 PM    GLUCOSE 96 09/13/2022 03:05 PM    BUN 15 09/13/2022 03:05 PM    CREATININE 0.9 09/13/2022 03:05 PM    CALCIUM 9.1 09/13/2022 03:05 PM    ALT 21 09/13/2022 03:05 PM    AST 26 09/13/2022 03:05 PM     Lab Results   Component Value Date/Time    TSH 1.270 09/13/2022 03:05 PM     Stress echo at [facility]    Assessment:     Diagnosis Orders   1. Abnormal stress echo        2. Decreased exercise tolerance  NM MYOCARDIAL SPECT REST EXERCISE OR RX      3. Shortness of breath  NM MYOCARDIAL SPECT REST EXERCISE OR RX      4. Mixed hyperlipidemia        5. Cigarette nicotine dependence with nicotine-induced disorder          Abnormal stress echo-poor exercise tolerance and EKG changes. Test was read as borderline for ischemia. Higher level Lexiscan nuclear stress test recommended. And will be a Lexiscan nuclear stress test.  She continues to have same symptoms of decreased exercise tolerance and shortness of breath    Nicotine dependence-Instructed patient in smoking cessation rationales, strategies, and available resources x 3 minutes. She is slowly tapering back. Discussed nicotine replacement therapy and setting a quit date. Hyperlipidemia-uncontrolled as evidenced by most recent lipid panel with LDL of 160. She has chosen not to take cholesterol medicine previously. Encouraged her to consider this for CAD prevention. She will discuss further with her PCP    Plan    Orders Placed This Encounter   Procedures    NM MYOCARDIAL SPECT REST EXERCISE OR RX     With myocardial perfusion study with sestamibi     Standing Status:   Future     Standing Expiration Date:   11/28/2023     Order Specific Question:   Reason for Exam?     Answer:   Shortness of breath     Order Specific Question:   Procedure Type     Answer:   Rx     Order Specific Question:   Reason for exam:     Answer:   decreased exercise tolerance, shortness of breath     Return in about 2 months (around 1/28/2023) for APRN. Quit smoking. Call the [address] [place] [address] 2-258-QUIT-NOW   Consider treating cholesterol- discuss with PCP  Дмитрий Ortiz Nuclear Stress Test    Call with any questionsor concerns  Follow up with NI Casey for non cardiac problems  Report any new problems  Cardiovascular Fitness-Exercise as tolerated.   Strive for 15 minutes of exercise most days of the week. Cardiac / HealthyDiet  Continue current medications as directed  Continue plan of treatment  It is always recommended that you bring your medicationsbottles with you to each visit - this is for your safety!        NI Dias

## 2022-11-28 NOTE — PATIENT INSTRUCTIONS
Return in about 2 months (around 1/28/2023) for APRN. Quit smoking. Call the [address] 4-838-QUIT-NOW   Consider treating cholesterol- discuss with PCP  Snow Boykin Nuclear Stress Test    [place] at the [address] and [address] [facility] located on the first floor of [place] [address] through hospital main entrance and turn immediately to your left. Patient's contact number:  [phone] (home)      Lexiscan Stress Test      Lexiscan (regadenoson injection) is a prescription drug given through an IV line that increases blood flow through the arteries of the heart during a cardiac nuclear stress test.     There are two parts to a Lexiscan stress test: the rest portion and the exercise portion. For the rest portion, a radioactive tracer is injected into your arm through the IV. After 30 to 60 minutes, the process of imaging will begin. A nuclear camera will be placed on your chest area and images are taken for the next 15 to 20 minutes. For the exercise portion, a nurse will attach EKG electrodes to your chest to monitor your heart rate. The drug Snow Boykin is administered to simulate stress on the heart. Your heart rhythm will then be monitored for the next few minutes. Your blood pressure will also be monitored throughout the exercise portion. [place] through the exercise portion, a second round of radioactive tracer is injected into your body. Your heart rate and EKG will be monitored for another few minutes after administering the drug. Test Preparation:    Bring a list of your current medications. Do not take any of your medications the morning of the test, but bring all morning medications with you as you will take them after the stress portion of the test is completed. Do not eat Bananas 24 hours prior to test.    No caffeine 24 hours prior to the testing. This includes: coffee, pop/soda, chocolate, cold medications, etc.  Any product that might contain caffeine. No nicotine or alcohol 12 hours prior to your test.   Nothing to eat or drink 6-8 hours prior to appointment time. It is okay to drink small amounts of water during the four hours prior to the test.  Nitroglycerin patches must be taken off 1 hour before testing. Wear comfortable clothing. Please refrain from any strenuous exercise or activities the day before your test, or the day of your test.  The Nuclear Lexiscan Stress test takes about 2 ½ to 3 hours to complete. If for any reason you are unable to keep this appointment, please contact Outpatient Scheduling, [phone], as soon as possible to reschedule.

## 2023-03-16 ENCOUNTER — TELEPHONE (OUTPATIENT)
Dept: FAMILY MEDICINE CLINIC | Age: 73
End: 2023-03-16

## 2023-04-17 ENCOUNTER — OFFICE VISIT (OUTPATIENT)
Dept: CARDIOLOGY CLINIC | Age: 73
End: 2023-04-17
Payer: MEDICARE

## 2023-04-17 VITALS
BODY MASS INDEX: 30.91 KG/M2 | HEIGHT: 62 IN | HEART RATE: 78 BPM | WEIGHT: 168 LBS | SYSTOLIC BLOOD PRESSURE: 128 MMHG | DIASTOLIC BLOOD PRESSURE: 70 MMHG | OXYGEN SATURATION: 97 %

## 2023-04-17 DIAGNOSIS — F17.219 CIGARETTE NICOTINE DEPENDENCE WITH NICOTINE-INDUCED DISORDER: ICD-10-CM

## 2023-04-17 DIAGNOSIS — E78.2 MIXED HYPERLIPIDEMIA: Primary | ICD-10-CM

## 2023-04-17 DIAGNOSIS — Z82.49 FAMILY HISTORY OF HEART DISEASE: ICD-10-CM

## 2023-04-17 PROCEDURE — 99213 OFFICE O/P EST LOW 20 MIN: CPT | Performed by: CLINICAL NURSE SPECIALIST

## 2023-04-17 PROCEDURE — 3017F COLORECTAL CA SCREEN DOC REV: CPT | Performed by: CLINICAL NURSE SPECIALIST

## 2023-04-17 PROCEDURE — 1123F ACP DISCUSS/DSCN MKR DOCD: CPT | Performed by: CLINICAL NURSE SPECIALIST

## 2023-04-17 PROCEDURE — 1090F PRES/ABSN URINE INCON ASSESS: CPT | Performed by: CLINICAL NURSE SPECIALIST

## 2023-04-17 PROCEDURE — 99406 BEHAV CHNG SMOKING 3-10 MIN: CPT | Performed by: CLINICAL NURSE SPECIALIST

## 2023-04-17 PROCEDURE — G8417 CALC BMI ABV UP PARAM F/U: HCPCS | Performed by: CLINICAL NURSE SPECIALIST

## 2023-04-17 PROCEDURE — 4004F PT TOBACCO SCREEN RCVD TLK: CPT | Performed by: CLINICAL NURSE SPECIALIST

## 2023-04-17 PROCEDURE — G8400 PT W/DXA NO RESULTS DOC: HCPCS | Performed by: CLINICAL NURSE SPECIALIST

## 2023-04-17 PROCEDURE — G8427 DOCREV CUR MEDS BY ELIG CLIN: HCPCS | Performed by: CLINICAL NURSE SPECIALIST

## 2023-04-17 RX ORDER — PRAVASTATIN SODIUM 40 MG
40 TABLET ORAL NIGHTLY
Qty: 90 TABLET | Refills: 3 | Status: SHIPPED | OUTPATIENT
Start: 2023-04-17

## 2023-04-17 ASSESSMENT — ENCOUNTER SYMPTOMS
SHORTNESS OF BREATH: 1
WHEEZING: 0
VOMITING: 0
FACIAL SWELLING: 0
CHEST TIGHTNESS: 0
EYE REDNESS: 0
NAUSEA: 0
COUGH: 0
ABDOMINAL PAIN: 0

## 2023-04-17 NOTE — PATIENT INSTRUCTIONS
Return in about 1 year (around 4/17/2024) for APRN. Quit smoking.   Call the [address] [place] [address] 2-128-QUIT-NOW   Start Pravastatin 40mg daily at bedtime  Recheck fasting Lipids 6 weeks- early June  Healthy heart diet  Work up to 30 min of exercise 5 times per week

## 2023-04-17 NOTE — PROGRESS NOTES
[facility], [address] [place] [address], [address] [address]  Phone: [phone]  Fax: [phone]    OFFICE VISIT:  2023    Lolis Givens - : 1950    Reason For Visit:  Corrinne Chang is a 67 y.o. female who is here for Follow-up and Shortness of Breath       Diagnosis Orders   1. Mixed hyperlipidemia  ALT    AST    Lipid Panel    ALT    AST    Lipid Panel      2. Cigarette nicotine dependence with nicotine-induced disorder        3. Family history of heart disease                HPI  Patient was referred by her PCP for complaints of worsening fatigue, twinges of chest pain and shortness of breath. She reports history of long-term smoking and hyperlipidemia which is untreated by patient choice. .  There is family history of heart disease in her mother    Patient had a stress echo that showed some borderline abnormality. A higher level Cely Brice was recommended and she is here today to discuss results. She denies any further exertional chest discomfort. She has some mild dyspnea which she feels is unchanged. There is no complaints of orthopnea, PND, palpitations. She does complain of fatigue and paces her activities      NI Fabian is PCP. Lolis Givens has the following history as recorded in [employer]: There are no problems to display for this patient.     Past Medical History:   Diagnosis Date    Hx of gastric ulcer      Past Surgical History:   Procedure Laterality Date    BREAST CYST EXCISION Right     GUM SURGERY       Family History   Problem Relation Age of Onset    Heart Disease Mother     No Known Problems Father     Diabetes Sister     Tuberculosis Maternal Grandmother     Tuberculosis Maternal Grandfather      Social History     Tobacco Use    Smoking status: Every Day     Packs/day: 0.50     Years: 58.00     Pack years: 29.00     Types: Cigarettes     Start date: 3/3/1963     Passive exposure: Current    Smokeless tobacco: Never   Substance Use Topics

## 2024-04-24 DIAGNOSIS — E78.2 MIXED HYPERLIPIDEMIA: Primary | ICD-10-CM

## 2024-05-06 DIAGNOSIS — E78.2 MIXED HYPERLIPIDEMIA: ICD-10-CM

## 2024-05-06 LAB
ALT SERPL-CCNC: 14 U/L (ref 5–33)
AST SERPL-CCNC: 19 U/L (ref 5–32)
CHOLEST SERPL-MCNC: 188 MG/DL (ref 160–199)
HDLC SERPL-MCNC: 62 MG/DL (ref 65–121)
LDLC SERPL CALC-MCNC: 98 MG/DL
TRIGL SERPL-MCNC: 138 MG/DL (ref 0–149)

## 2024-05-13 ENCOUNTER — OFFICE VISIT (OUTPATIENT)
Dept: CARDIOLOGY CLINIC | Age: 74
End: 2024-05-13
Payer: MEDICARE

## 2024-05-13 VITALS
BODY MASS INDEX: 31.47 KG/M2 | DIASTOLIC BLOOD PRESSURE: 92 MMHG | WEIGHT: 171 LBS | HEIGHT: 62 IN | HEART RATE: 71 BPM | SYSTOLIC BLOOD PRESSURE: 180 MMHG

## 2024-05-13 DIAGNOSIS — Z82.49 FAMILY HISTORY OF HEART DISEASE: ICD-10-CM

## 2024-05-13 DIAGNOSIS — E78.2 MIXED HYPERLIPIDEMIA: ICD-10-CM

## 2024-05-13 DIAGNOSIS — F17.219 CIGARETTE NICOTINE DEPENDENCE WITH NICOTINE-INDUCED DISORDER: ICD-10-CM

## 2024-05-13 PROCEDURE — G8417 CALC BMI ABV UP PARAM F/U: HCPCS | Performed by: CLINICAL NURSE SPECIALIST

## 2024-05-13 PROCEDURE — G8400 PT W/DXA NO RESULTS DOC: HCPCS | Performed by: CLINICAL NURSE SPECIALIST

## 2024-05-13 PROCEDURE — 99213 OFFICE O/P EST LOW 20 MIN: CPT | Performed by: CLINICAL NURSE SPECIALIST

## 2024-05-13 PROCEDURE — 1090F PRES/ABSN URINE INCON ASSESS: CPT | Performed by: CLINICAL NURSE SPECIALIST

## 2024-05-13 PROCEDURE — 99406 BEHAV CHNG SMOKING 3-10 MIN: CPT | Performed by: CLINICAL NURSE SPECIALIST

## 2024-05-13 PROCEDURE — 4004F PT TOBACCO SCREEN RCVD TLK: CPT | Performed by: CLINICAL NURSE SPECIALIST

## 2024-05-13 PROCEDURE — G8427 DOCREV CUR MEDS BY ELIG CLIN: HCPCS | Performed by: CLINICAL NURSE SPECIALIST

## 2024-05-13 PROCEDURE — 3017F COLORECTAL CA SCREEN DOC REV: CPT | Performed by: CLINICAL NURSE SPECIALIST

## 2024-05-13 PROCEDURE — 93000 ELECTROCARDIOGRAM COMPLETE: CPT | Performed by: CLINICAL NURSE SPECIALIST

## 2024-05-13 PROCEDURE — 1123F ACP DISCUSS/DSCN MKR DOCD: CPT | Performed by: CLINICAL NURSE SPECIALIST

## 2024-05-13 ASSESSMENT — ENCOUNTER SYMPTOMS
WHEEZING: 0
CHEST TIGHTNESS: 0
EYE REDNESS: 0
VOMITING: 0
NAUSEA: 0
ABDOMINAL PAIN: 0
SHORTNESS OF BREATH: 1

## 2024-05-13 NOTE — PROGRESS NOTES
[facility], [facility]  [address] [place] [address], [place] [address]  Phone: [phone]  Fax: [phone]    OFFICE VISIT:  2024    Alejandra Castelan - : 1950    Reason For Visit:  Alejandra is a 73 y.o. female who is here for Follow-up (No cardiac symptoms) and Mixed hyperlipidemia       Diagnosis Orders   1. Elevated blood pressure affecting pregnancy in first trimester, antepartum        2. Mixed hyperlipidemia  EKG 12 lead      3. Family history of heart disease        4. Cigarette nicotine dependence with nicotine-induced disorder                HPI  Patient was referred by her PCP for complaints of worsening fatigue, twinges of chest pain and shortness of breath.  She reports history of long-term smoking and hyperlipidemia which is untreated by patient choice.  There is family history of heart disease in her mother    Patient had a stress echo that showed some borderline abnormality.  A higher level Lexiscan showed no evidence of ischemia 2023    She denies any exertional chest discomfort.  She has some mild dyspnea which she feels is unchanged.  There is no complaints of orthopnea, PND, palpitations.  She does complain of fatigue and paces her activities    Blood pressure is quite elevated in the office today which she is attributing to whitecoat hypertension.  She states she notices lower readings at home, but actually has not really monitored recently and also has a wrist cuff rather than an arm cuff      Selam Gil APRN is PCP.  Alejandra Castelan has the following history as recorded in [employer]:    There are no problems to display for this patient.    Past Medical History:   Diagnosis Date    Hx of gastric ulcer      Past Surgical History:   Procedure Laterality Date    BREAST CYST EXCISION Right     GUM SURGERY       Family History   Problem Relation Age of Onset    Heart Disease Mother     No Known Problems Father     Diabetes Sister     Tuberculosis Maternal

## 2024-05-13 NOTE — PATIENT INSTRUCTIONS
Return in about 1 month (around 6/13/2024) for APRN.  Quit smoking.  Call the [place] Tobacco Quit Line 1-800-QUIT-NOW   Purchase arm BP cuff, monitor daily, keep log    Healthy heart diet  Work up to 30 min of exercise 5 times per week

## 2024-05-23 ENCOUNTER — TELEPHONE (OUTPATIENT)
Dept: CARDIOLOGY CLINIC | Age: 74
End: 2024-05-23

## 2024-05-23 NOTE — TELEPHONE ENCOUNTER
Called and lvm x1 for Pt. Pt is currently scheduled for our [facility] location. Due to scheduling restraints patient will need to be moved to same day, same time at the [place] location with NI Marshall.

## 2024-05-24 NOTE — TELEPHONE ENCOUNTER
Called and lvm x2 for Pt. Pt is currently scheduled for our [facility] location. Due to scheduling restraints patient will need to be moved to same day, same time at the [place] location with NI Marshall.

## 2024-05-30 ENCOUNTER — TELEPHONE (OUTPATIENT)
Dept: CARDIOLOGY CLINIC | Age: 74
End: 2024-05-30

## 2024-05-30 NOTE — TELEPHONE ENCOUNTER
I would like her to bring in her log of blood pressure readings and her cuff to the next appointment so that we can do a comparison reading with our cuff in the office.  Therefore, I would like her to come into the office for follow-up

## 2024-05-30 NOTE — TELEPHONE ENCOUNTER
The pt doesn't want to come to [place] for the BP readings.  The want to know if she can just call in the with BP readings.  The pt is keeping a log of her BP readings.

## 2024-05-31 NOTE — TELEPHONE ENCOUNTER
Patient notified of advisement and has been rescheduled for [facility] 6/17/2024 11:15 AM. She voiced understanding.

## 2024-06-17 ENCOUNTER — OFFICE VISIT (OUTPATIENT)
Dept: CARDIOLOGY CLINIC | Age: 74
End: 2024-06-17
Payer: MEDICARE

## 2024-06-17 VITALS
HEART RATE: 78 BPM | BODY MASS INDEX: 30.73 KG/M2 | DIASTOLIC BLOOD PRESSURE: 88 MMHG | SYSTOLIC BLOOD PRESSURE: 150 MMHG | HEIGHT: 62 IN | OXYGEN SATURATION: 97 % | WEIGHT: 167 LBS

## 2024-06-17 DIAGNOSIS — I10 PRIMARY HYPERTENSION: Primary | ICD-10-CM

## 2024-06-17 PROCEDURE — 1090F PRES/ABSN URINE INCON ASSESS: CPT | Performed by: CLINICAL NURSE SPECIALIST

## 2024-06-17 PROCEDURE — 3077F SYST BP >= 140 MM HG: CPT | Performed by: CLINICAL NURSE SPECIALIST

## 2024-06-17 PROCEDURE — 3079F DIAST BP 80-89 MM HG: CPT | Performed by: CLINICAL NURSE SPECIALIST

## 2024-06-17 PROCEDURE — G8417 CALC BMI ABV UP PARAM F/U: HCPCS | Performed by: CLINICAL NURSE SPECIALIST

## 2024-06-17 PROCEDURE — 1123F ACP DISCUSS/DSCN MKR DOCD: CPT | Performed by: CLINICAL NURSE SPECIALIST

## 2024-06-17 PROCEDURE — 99213 OFFICE O/P EST LOW 20 MIN: CPT | Performed by: CLINICAL NURSE SPECIALIST

## 2024-06-17 PROCEDURE — G8400 PT W/DXA NO RESULTS DOC: HCPCS | Performed by: CLINICAL NURSE SPECIALIST

## 2024-06-17 PROCEDURE — 3017F COLORECTAL CA SCREEN DOC REV: CPT | Performed by: CLINICAL NURSE SPECIALIST

## 2024-06-17 PROCEDURE — G8427 DOCREV CUR MEDS BY ELIG CLIN: HCPCS | Performed by: CLINICAL NURSE SPECIALIST

## 2024-06-17 PROCEDURE — 4004F PT TOBACCO SCREEN RCVD TLK: CPT | Performed by: CLINICAL NURSE SPECIALIST

## 2024-06-17 RX ORDER — NAPROXEN 250 MG/1
250 TABLET ORAL PRN
COMMUNITY

## 2024-06-17 RX ORDER — LOSARTAN POTASSIUM AND HYDROCHLOROTHIAZIDE 12.5; 5 MG/1; MG/1
1 TABLET ORAL DAILY
Qty: 90 TABLET | Refills: 1 | Status: SHIPPED | OUTPATIENT
Start: 2024-06-17

## 2024-06-17 ASSESSMENT — ENCOUNTER SYMPTOMS
SHORTNESS OF BREATH: 1
CHEST TIGHTNESS: 0
WHEEZING: 0
EYE REDNESS: 0
NAUSEA: 0
COUGH: 0
FACIAL SWELLING: 0
VOMITING: 0
ABDOMINAL PAIN: 0

## 2024-06-17 NOTE — PROGRESS NOTES
[facility], [facility]  [address] [address], [place] [address]  Phone: [phone]  Fax: [phone]    OFFICE VISIT:  2024    Alejandra Castelan - : 1950    Reason For Visit:  Alejandra is a 73 y.o. female who is here for Follow-up (Patient is still having high blood pressure) and Hypertension       Diagnosis Orders   1. Primary hypertension                  HPI  Patient was referred by her PCP for complaints of worsening fatigue, twinges of chest pain and shortness of breath in .  She reports history of long-term smoking and hyperlipidemia .  There is family history of heart disease in her mother    Patient had a stress echo that showed some borderline abnormality.  A higher level Lexiscan showed no evidence of ischemia 2023    At recent in May, her blood pressure was quite elevated which was surprising to her.  She purchased a blood pressure cuff and has been monitoring for the last several weeks and she has noted significantly elevated readings.  Her main complaint is fatigue.      Selam Gil APRN is PCP.  Alejandra Castelan has the following history as recorded in [employer]:    There are no problems to display for this patient.    Past Medical History:   Diagnosis Date    Hx of gastric ulcer      Past Surgical History:   Procedure Laterality Date    BREAST CYST EXCISION Right     GUM SURGERY       Family History   Problem Relation Age of Onset    Heart Disease Mother     No Known Problems Father     Diabetes Sister     Tuberculosis Maternal Grandmother     Tuberculosis Maternal Grandfather      Social History     Tobacco Use    Smoking status: Every Day     Current packs/day: 0.50     Average packs/day: 0.5 packs/day for 61.3 years (30.6 ttl pk-yrs)     Types: Cigarettes     Start date: 3/3/1963     Passive exposure: Current    Smokeless tobacco: Never   Substance Use Topics    Alcohol use: Yes     Comment: rarely      Current Outpatient Medications   Medication Sig

## 2024-07-17 ENCOUNTER — OFFICE VISIT (OUTPATIENT)
Dept: CARDIOLOGY CLINIC | Age: 74
End: 2024-07-17
Payer: MEDICARE

## 2024-07-17 ENCOUNTER — TELEPHONE (OUTPATIENT)
Dept: CARDIOLOGY CLINIC | Age: 74
End: 2024-07-17

## 2024-07-17 VITALS
DIASTOLIC BLOOD PRESSURE: 72 MMHG | HEIGHT: 62 IN | HEART RATE: 90 BPM | SYSTOLIC BLOOD PRESSURE: 140 MMHG | BODY MASS INDEX: 30 KG/M2 | OXYGEN SATURATION: 98 % | WEIGHT: 163 LBS

## 2024-07-17 DIAGNOSIS — I10 PRIMARY HYPERTENSION: ICD-10-CM

## 2024-07-17 DIAGNOSIS — I10 PRIMARY HYPERTENSION: Primary | ICD-10-CM

## 2024-07-17 DIAGNOSIS — R25.2 LEG CRAMPS: ICD-10-CM

## 2024-07-17 DIAGNOSIS — E78.2 MIXED HYPERLIPIDEMIA: ICD-10-CM

## 2024-07-17 DIAGNOSIS — F17.218 CIGARETTE NICOTINE DEPENDENCE WITH OTHER NICOTINE-INDUCED DISORDER: ICD-10-CM

## 2024-07-17 LAB
ANION GAP SERPL CALCULATED.3IONS-SCNC: 13 MMOL/L (ref 7–19)
BUN SERPL-MCNC: 19 MG/DL (ref 8–23)
CALCIUM SERPL-MCNC: 9.2 MG/DL (ref 8.8–10.2)
CHLORIDE SERPL-SCNC: 101 MMOL/L (ref 98–111)
CO2 SERPL-SCNC: 25 MMOL/L (ref 22–29)
CREAT SERPL-MCNC: 0.9 MG/DL (ref 0.5–0.9)
GLUCOSE SERPL-MCNC: 100 MG/DL (ref 74–109)
POTASSIUM SERPL-SCNC: 4.4 MMOL/L (ref 3.5–5)
SODIUM SERPL-SCNC: 139 MMOL/L (ref 136–145)

## 2024-07-17 PROCEDURE — 3078F DIAST BP <80 MM HG: CPT | Performed by: CLINICAL NURSE SPECIALIST

## 2024-07-17 PROCEDURE — 1090F PRES/ABSN URINE INCON ASSESS: CPT | Performed by: CLINICAL NURSE SPECIALIST

## 2024-07-17 PROCEDURE — 4004F PT TOBACCO SCREEN RCVD TLK: CPT | Performed by: CLINICAL NURSE SPECIALIST

## 2024-07-17 PROCEDURE — 99214 OFFICE O/P EST MOD 30 MIN: CPT | Performed by: CLINICAL NURSE SPECIALIST

## 2024-07-17 PROCEDURE — G8427 DOCREV CUR MEDS BY ELIG CLIN: HCPCS | Performed by: CLINICAL NURSE SPECIALIST

## 2024-07-17 PROCEDURE — G8417 CALC BMI ABV UP PARAM F/U: HCPCS | Performed by: CLINICAL NURSE SPECIALIST

## 2024-07-17 PROCEDURE — 1123F ACP DISCUSS/DSCN MKR DOCD: CPT | Performed by: CLINICAL NURSE SPECIALIST

## 2024-07-17 PROCEDURE — 3077F SYST BP >= 140 MM HG: CPT | Performed by: CLINICAL NURSE SPECIALIST

## 2024-07-17 PROCEDURE — G8400 PT W/DXA NO RESULTS DOC: HCPCS | Performed by: CLINICAL NURSE SPECIALIST

## 2024-07-17 PROCEDURE — 3017F COLORECTAL CA SCREEN DOC REV: CPT | Performed by: CLINICAL NURSE SPECIALIST

## 2024-07-17 ASSESSMENT — ENCOUNTER SYMPTOMS
ABDOMINAL PAIN: 0
COUGH: 0
EYE REDNESS: 0
CHEST TIGHTNESS: 0
SHORTNESS OF BREATH: 1
VOMITING: 0
WHEEZING: 0
FACIAL SWELLING: 0
NAUSEA: 0

## 2024-07-17 NOTE — TELEPHONE ENCOUNTER
Patient returned call for results, results provided and she voiced understanding.     Result Care Coordination      Result Notes     Samantha HENRY Verde, APRN  7/17/2024  1:42 PM CDT Back to Top      Please let patient know her labs are all in the normal range.  No electrolyte issues, stable kidney function.  With the cramping she is having, encouraged her to get back on her multivitamin.  I would encourage her to break her pravastatin in half for several weeks and see if this helps her leg cramps.      alert and oriented x3

## 2024-07-17 NOTE — PROGRESS NOTES
Basic Metabolic Panel     Standing Status:   Future     Number of Occurrences:   1     Standing Expiration Date:   7/17/2025     Return in about 2 months (around 9/17/2024) for NI.  Continue  Losartan/ HCTZ 50/12.5mg daily  Lab- BMP today    Could do a trial of halving Pravastatin due to leg pain    Get back on multivitamin    Quit smoking.  Call the [place] Tobacco Quit Line 1800-QUIT-NOW     Healthy heart diet  Work up to 30 min of exercise 5 times per week     Call with any questionsor concerns  Follow up with Selam Gil APRN for non cardiac problems  Report any new problems  Cardiovascular Fitness-Exercise as tolerated.  Strive for 15 minutes of exercise most days of the week.    Cardiac / HealthyDiet  Continue current medications as directed  Continue plan of treatment  It is always recommended that you bring your medicationsbottles with you to each visit - this is for your safety!       NI Bishop

## 2024-07-17 NOTE — PATIENT INSTRUCTIONS
Return in about 2 months (around 9/17/2024) for APRN.  Continue  Losartan/ HCTZ 50/12.5mg daily  Lab- BMP today    Could do a trial of halving Pravastatin due to leg pain    Get back on multivitamin    Quit smoking.  Call the [place] Tobacco Quit Line 8-062-QUIT-NOW     Healthy heart diet  Work up to 30 min of exercise 5 times per week

## 2024-09-16 ENCOUNTER — OFFICE VISIT (OUTPATIENT)
Dept: CARDIOLOGY CLINIC | Age: 74
End: 2024-09-16
Payer: MEDICARE

## 2024-09-16 VITALS
DIASTOLIC BLOOD PRESSURE: 78 MMHG | WEIGHT: 159 LBS | SYSTOLIC BLOOD PRESSURE: 142 MMHG | BODY MASS INDEX: 29.26 KG/M2 | HEIGHT: 62 IN | OXYGEN SATURATION: 98 % | HEART RATE: 78 BPM

## 2024-09-16 DIAGNOSIS — F17.218 CIGARETTE NICOTINE DEPENDENCE WITH OTHER NICOTINE-INDUCED DISORDER: ICD-10-CM

## 2024-09-16 DIAGNOSIS — E78.2 MIXED HYPERLIPIDEMIA: ICD-10-CM

## 2024-09-16 DIAGNOSIS — I10 PRIMARY HYPERTENSION: Primary | ICD-10-CM

## 2024-09-16 PROCEDURE — 4004F PT TOBACCO SCREEN RCVD TLK: CPT | Performed by: CLINICAL NURSE SPECIALIST

## 2024-09-16 PROCEDURE — G8400 PT W/DXA NO RESULTS DOC: HCPCS | Performed by: CLINICAL NURSE SPECIALIST

## 2024-09-16 PROCEDURE — 1090F PRES/ABSN URINE INCON ASSESS: CPT | Performed by: CLINICAL NURSE SPECIALIST

## 2024-09-16 PROCEDURE — 3017F COLORECTAL CA SCREEN DOC REV: CPT | Performed by: CLINICAL NURSE SPECIALIST

## 2024-09-16 PROCEDURE — G8417 CALC BMI ABV UP PARAM F/U: HCPCS | Performed by: CLINICAL NURSE SPECIALIST

## 2024-09-16 PROCEDURE — 1123F ACP DISCUSS/DSCN MKR DOCD: CPT | Performed by: CLINICAL NURSE SPECIALIST

## 2024-09-16 PROCEDURE — 3077F SYST BP >= 140 MM HG: CPT | Performed by: CLINICAL NURSE SPECIALIST

## 2024-09-16 PROCEDURE — 99213 OFFICE O/P EST LOW 20 MIN: CPT | Performed by: CLINICAL NURSE SPECIALIST

## 2024-09-16 PROCEDURE — 3078F DIAST BP <80 MM HG: CPT | Performed by: CLINICAL NURSE SPECIALIST

## 2024-09-16 PROCEDURE — G8427 DOCREV CUR MEDS BY ELIG CLIN: HCPCS | Performed by: CLINICAL NURSE SPECIALIST

## 2024-09-16 RX ORDER — PRAVASTATIN SODIUM 20 MG
20 TABLET ORAL DAILY
Qty: 90 TABLET | Refills: 3 | Status: SHIPPED | OUTPATIENT
Start: 2024-09-16

## 2024-09-16 ASSESSMENT — ENCOUNTER SYMPTOMS
EYE REDNESS: 0
SHORTNESS OF BREATH: 1
COUGH: 0
CHEST TIGHTNESS: 0
WHEEZING: 0
NAUSEA: 0
ABDOMINAL PAIN: 0
VOMITING: 0
FACIAL SWELLING: 0

## 2024-12-17 RX ORDER — LOSARTAN POTASSIUM AND HYDROCHLOROTHIAZIDE 12.5; 5 MG/1; MG/1
1 TABLET ORAL DAILY
Qty: 90 TABLET | Refills: 1 | Status: SHIPPED | OUTPATIENT
Start: 2024-12-17

## 2025-06-30 RX ORDER — LOSARTAN POTASSIUM AND HYDROCHLOROTHIAZIDE 12.5; 5 MG/1; MG/1
1 TABLET ORAL DAILY
Qty: 90 TABLET | Refills: 0 | Status: SHIPPED | OUTPATIENT
Start: 2025-06-30

## 2025-09-04 ENCOUNTER — OFFICE VISIT (OUTPATIENT)
Age: 75
End: 2025-09-04
Payer: MEDICARE

## 2025-09-04 VITALS
HEART RATE: 100 BPM | OXYGEN SATURATION: 98 % | WEIGHT: 147 LBS | TEMPERATURE: 97.2 F | DIASTOLIC BLOOD PRESSURE: 94 MMHG | HEIGHT: 62 IN | SYSTOLIC BLOOD PRESSURE: 145 MMHG | BODY MASS INDEX: 27.05 KG/M2

## 2025-09-04 DIAGNOSIS — J20.9 ACUTE BRONCHITIS, UNSPECIFIED ORGANISM: Primary | ICD-10-CM

## 2025-09-04 PROCEDURE — G8417 CALC BMI ABV UP PARAM F/U: HCPCS | Performed by: NURSE PRACTITIONER

## 2025-09-04 PROCEDURE — G8400 PT W/DXA NO RESULTS DOC: HCPCS | Performed by: NURSE PRACTITIONER

## 2025-09-04 PROCEDURE — 1159F MED LIST DOCD IN RCRD: CPT | Performed by: NURSE PRACTITIONER

## 2025-09-04 PROCEDURE — 1090F PRES/ABSN URINE INCON ASSESS: CPT | Performed by: NURSE PRACTITIONER

## 2025-09-04 PROCEDURE — G8427 DOCREV CUR MEDS BY ELIG CLIN: HCPCS | Performed by: NURSE PRACTITIONER

## 2025-09-04 PROCEDURE — 99213 OFFICE O/P EST LOW 20 MIN: CPT | Performed by: NURSE PRACTITIONER

## 2025-09-04 PROCEDURE — 4004F PT TOBACCO SCREEN RCVD TLK: CPT | Performed by: NURSE PRACTITIONER

## 2025-09-04 PROCEDURE — 1123F ACP DISCUSS/DSCN MKR DOCD: CPT | Performed by: NURSE PRACTITIONER

## 2025-09-04 RX ORDER — ALBUTEROL SULFATE 90 UG/1
2 INHALANT RESPIRATORY (INHALATION) EVERY 6 HOURS PRN
Qty: 18 G | Refills: 3 | Status: SHIPPED | OUTPATIENT
Start: 2025-09-04

## 2025-09-04 RX ORDER — AZITHROMYCIN 250 MG/1
TABLET, FILM COATED ORAL
Qty: 6 TABLET | Refills: 0 | Status: SHIPPED | OUTPATIENT
Start: 2025-09-04 | End: 2025-09-14

## 2025-09-04 RX ORDER — PREDNISONE 20 MG/1
20 TABLET ORAL DAILY
Qty: 5 TABLET | Refills: 0 | Status: SHIPPED | OUTPATIENT
Start: 2025-09-04 | End: 2025-09-09

## 2025-09-04 SDOH — ECONOMIC STABILITY: FOOD INSECURITY: WITHIN THE PAST 12 MONTHS, THE FOOD YOU BOUGHT JUST DIDN'T LAST AND YOU DIDN'T HAVE MONEY TO GET MORE.: NEVER TRUE

## 2025-09-04 SDOH — ECONOMIC STABILITY: FOOD INSECURITY: WITHIN THE PAST 12 MONTHS, YOU WORRIED THAT YOUR FOOD WOULD RUN OUT BEFORE YOU GOT MONEY TO BUY MORE.: NEVER TRUE

## 2025-09-04 ASSESSMENT — ENCOUNTER SYMPTOMS
SORE THROAT: 0
SINUS PRESSURE: 1
COUGH: 1
SHORTNESS OF BREATH: 1
SINUS PAIN: 1

## 2025-09-04 ASSESSMENT — PATIENT HEALTH QUESTIONNAIRE - PHQ9
SUM OF ALL RESPONSES TO PHQ QUESTIONS 1-9: 0
2. FEELING DOWN, DEPRESSED OR HOPELESS: NOT AT ALL
SUM OF ALL RESPONSES TO PHQ QUESTIONS 1-9: 0
1. LITTLE INTEREST OR PLEASURE IN DOING THINGS: NOT AT ALL
SUM OF ALL RESPONSES TO PHQ QUESTIONS 1-9: 0
SUM OF ALL RESPONSES TO PHQ QUESTIONS 1-9: 0